# Patient Record
Sex: FEMALE | URBAN - METROPOLITAN AREA
[De-identification: names, ages, dates, MRNs, and addresses within clinical notes are randomized per-mention and may not be internally consistent; named-entity substitution may affect disease eponyms.]

---

## 2020-07-02 ENCOUNTER — APPOINTMENT (RX ONLY)
Dept: URBAN - METROPOLITAN AREA CLINIC 125 | Facility: CLINIC | Age: 30
Setting detail: DERMATOLOGY
End: 2020-07-02

## 2020-07-02 DIAGNOSIS — L65.9 NONSCARRING HAIR LOSS, UNSPECIFIED: ICD-10-CM

## 2020-07-02 PROCEDURE — 99202 OFFICE O/P NEW SF 15 MIN: CPT

## 2020-07-02 PROCEDURE — ? COUNSELING

## 2020-07-02 PROCEDURE — ? ORDER TESTS

## 2020-07-02 PROCEDURE — ? TREATMENT REGIMEN

## 2020-07-02 ASSESSMENT — LOCATION ZONE DERM: LOCATION ZONE: SCALP

## 2020-07-02 ASSESSMENT — LOCATION DETAILED DESCRIPTION DERM: LOCATION DETAILED: LEFT SUPERIOR PARIETAL SCALP

## 2020-07-02 ASSESSMENT — LOCATION SIMPLE DESCRIPTION DERM: LOCATION SIMPLE: SCALP

## 2020-07-02 NOTE — HPI: HAIR LOSS
Previous Labs: No
How Did The Hair Loss Occur?: gradual in onset
How Severe Is Your Hair Loss?: moderate
Additional History: Patient states that her hair loss began 8 years ago when she was put on OCP. She stopped it and the hair loss improved. She had Kyleena IUD implanted a month ago and states she can see the hair loss happening again. She was prescribed Propecia 1mg, but has not started it yet since she wanted a second opinion by us and she is scared since she wants to have children some day. She states that she never had a biopsy done or labs run for this condition. 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 She has been on Viviscal x 4 months now.

## 2020-07-02 NOTE — PROCEDURE: TREATMENT REGIMEN
Detail Level: Simple
Continue Regimen: Viviscal hair supplements
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 Discussed that IUD can trigger a bout of telogen effluvium. Discussed that telogen effluvium is not a permanent firm of hairloss. Recommend hair collection test to bring at follow up. Discussed biopsy as well, she would like to do possibly at follow up. Advised against Propecia at this point since patient would like to attempt pregnancy in about three years. Discussed Spironolactone with patient instead. Re visit at follow up.
Initiate Treatment: Biotin 5000mcg QD\\nMinoxidil 5% QHS with Dermaroller

## 2020-07-02 NOTE — PROCEDURE: ORDER TESTS
Expected Date Of Service: 07/02/2020
Billing Type: Third-Party Bill
Performing Laboratory: [id number]
Bill For Surgical Tray: no

## 2020-08-10 ENCOUNTER — APPOINTMENT (RX ONLY)
Dept: URBAN - METROPOLITAN AREA CLINIC 125 | Facility: CLINIC | Age: 30
Setting detail: DERMATOLOGY
End: 2020-08-10

## 2020-08-10 DIAGNOSIS — L65.9 NONSCARRING HAIR LOSS, UNSPECIFIED: ICD-10-CM

## 2020-08-10 PROCEDURE — ? PHOTO-DOCUMENTATION

## 2020-08-10 PROCEDURE — ? ORDER TESTS

## 2020-08-10 PROCEDURE — ? COUNSELING

## 2020-08-10 PROCEDURE — 99213 OFFICE O/P EST LOW 20 MIN: CPT

## 2020-08-10 PROCEDURE — ? TREATMENT REGIMEN

## 2020-08-10 ASSESSMENT — LOCATION DETAILED DESCRIPTION DERM: LOCATION DETAILED: LEFT SUPERIOR PARIETAL SCALP

## 2020-08-10 ASSESSMENT — LOCATION ZONE DERM: LOCATION ZONE: SCALP

## 2020-08-10 ASSESSMENT — LOCATION SIMPLE DESCRIPTION DERM: LOCATION SIMPLE: SCALP

## 2020-08-10 NOTE — PROCEDURE: ORDER TESTS
Expected Date Of Service: 07/02/2020
Billing Type: Third-Party Bill
Performing Laboratory: [id number]
Bill For Surgical Tray: no

## 2020-08-10 NOTE — PROCEDURE: TREATMENT REGIMEN
Otc Regimen: Viviscal hair supplements (continue)\\nBiotin 5mg 1 PO QD (continue), Minoxidil 5% foam QHS (continue)
Detail Level: Simple
Plan: Discussed increased number of telogen hairs visible from hair collection test may be from the fluctuations in hormones (OCP 8 years ago during previous episode of hair loss & recent IUD for recent episode [pt already took IUD out 3 weeks ago]). Discussed spironolactone for treatment to consider due to elevated DHEAS (363) with our bloodwork & recent bloodwork with Dr. Burgess-endocrinologist ([id number]). Patient opts to wait if DHEAS can come back to normal on its own before pursuing spironolactone. Patient would like to retest DHEAS in 4-6 weeks to see how it’s trending; pt may call office to request lab order. Propecia is still not an option because patient would like to attempt pregnancy in ~3 years